# Patient Record
Sex: FEMALE | Race: BLACK OR AFRICAN AMERICAN | NOT HISPANIC OR LATINO | Employment: STUDENT | URBAN - METROPOLITAN AREA
[De-identification: names, ages, dates, MRNs, and addresses within clinical notes are randomized per-mention and may not be internally consistent; named-entity substitution may affect disease eponyms.]

---
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
## 2020-07-11 DIAGNOSIS — Z30.41 ENCOUNTER FOR SURVEILLANCE OF CONTRACEPTIVE PILLS: ICD-10-CM

## 2020-07-13 RX ORDER — NORGESTIMATE AND ETHINYL ESTRADIOL 0.25-0.035
KIT ORAL
Qty: 28 TABLET | Refills: 0 | Status: SHIPPED | OUTPATIENT
Start: 2020-07-13

## 2020-07-13 NOTE — TELEPHONE ENCOUNTER
Spoke with pt. Informed pt of medication refill and also of annual being due. Annual is scheduled. charleen PRICE

## 2020-07-30 ENCOUNTER — TELEPHONE (OUTPATIENT)
Dept: OBSTETRICS AND GYNECOLOGY | Facility: CLINIC | Age: 20
End: 2020-07-30

## 2020-07-30 NOTE — TELEPHONE ENCOUNTER
Attempted to contact pt. Pt did not answer. LVM for pt to contact clinic. RASHEL Izaguirre      ----- Message from Anh Gonzalez sent at 7/30/2020  1:48 PM CDT -----  Contact: pt  Pt is needing her prescription for SPRINTEC, 28, 0.25-35 mg-mcg per tablet sent to pharmacy listed below. If needed please call pt back at [phone] or [phone]          kristofer Reid

## 2020-08-11 ENCOUNTER — TELEPHONE (OUTPATIENT)
Dept: OBSTETRICS AND GYNECOLOGY | Facility: CLINIC | Age: 20
End: 2020-08-11

## 2020-08-11 NOTE — TELEPHONE ENCOUNTER
----- Message from Meera Cornejo sent at 8/11/2020  3:47 PM CDT -----  Regarding: missed call  Type:  Patient Returning Call    Who Called:#pt  Who Left Message for Patient:staff  Does the patient know what this is regarding?:appt  Would the patient rather a call back or a response via [facility]? Call back  Best Call Back Number:[phone]  Additional Information: Pt states that she will not possibly be able to make she is looking for another provider due to school. Please call back.Thanks

## 2020-08-11 NOTE — TELEPHONE ENCOUNTER
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  She would like a courtsey pack of pills and she will make an appt for her annual next month. The person did not sound like the pt, sounded like someone older like the pt mother. Informed her that courtesy was sent last month on the 13th and that she is well overdue for annual.  Please advise. RASHEL Zuniga    ----- Message from Victoria Willis sent at 8/11/2020 12:53 PM CDT -----  Type:  RX Refill Request    Who Called:  pt  Caden   Refill or New Rx:    refill   RX Name and Strength:  Birth Control med   How is the patient currently taking it? (ex. 1XDay):   Is this a 30 day or 90 day RX:   Preferred Pharmacy with phone number:   [facility] pharmacy in [place], [place] on [address] [phone]  Local or Mail Order:  Local   Ordering Provider:  Dr NELY Reyna   Would the patient rather a call back or a response via [facility]?   Call back   Best Call Back Number:   [phone]  Additional Information:  The prescription was suppose to be sent but the pharmacy has not received the script/please call//thanks/noris

## 2020-08-13 ENCOUNTER — TELEPHONE (OUTPATIENT)
Dept: OBSTETRICS AND GYNECOLOGY | Facility: CLINIC | Age: 20
End: 2020-08-13

## 2020-08-13 NOTE — TELEPHONE ENCOUNTER
I spoke to patient.  She stated she has not called and she is back a [place] and will see a doctor there.  Patient stated she has no idea who is calling, but it is not her.  I asked the patient if it could be her mother and she stated she had no idea who was calling.

## 2020-08-14 ENCOUNTER — TELEPHONE (OUTPATIENT)
Dept: OBSTETRICS AND GYNECOLOGY | Facility: CLINIC | Age: 20
End: 2020-08-14

## 2020-08-14 NOTE — TELEPHONE ENCOUNTER
Spoke with patient, patient stated she found out a way she could get her birth control refilled and she also stated that she needed to know what was her bp the last time she was seen ( 98/66). I also advised patient that     I just tried once again,patient sends my call to voice mailbox not set up.     Letting you know I returned her call. If she sends a patient portal message please let her know that she can contact our [facility] team and they can screen her for medicaid. The best contact numbers for [facility] [phone] and [phone]. Patient will need to set up her voicemail or answer when called to completed the process as she may be asked to leave a voicemail with [facility].     Let me know if I can be of further assistance,   Jaylyn Alvarenga     Patient voiced understanding about both phone numbers for [facility].

## 2020-08-14 NOTE — TELEPHONE ENCOUNTER
Patient currently doesn't have coverage and would like help getting coverage. I also advised patient to sign up for medicaid and that I would be sending you a message and patient voiced understanding.

## 2021-05-12 ENCOUNTER — PATIENT MESSAGE (OUTPATIENT)
Dept: RESEARCH | Facility: HOSPITAL | Age: 21
End: 2021-05-12